# Patient Record
Sex: FEMALE | Race: WHITE | URBAN - METROPOLITAN AREA
[De-identification: names, ages, dates, MRNs, and addresses within clinical notes are randomized per-mention and may not be internally consistent; named-entity substitution may affect disease eponyms.]

---
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
## 2024-10-14 ENCOUNTER — TELEPHONE (OUTPATIENT)
Dept: FAMILY MEDICINE CLINIC | Facility: CLINIC | Age: 24
End: 2024-10-14

## 2024-10-14 NOTE — TELEPHONE ENCOUNTER
Pt's mother is here with the other daughter (sister of pt) today 10/14/24--and mother reports that Iron is needing an Rx of the lexapro 20 mg QD just enough to tide her over until she sees me on 10/24/24 --and mother has picture of the most recent Rx bottle--and reports [facility] pt is changing to our practice her prior PCP will not give her a courtesy refill of this--and pt experiencing SE form the sudden running out of her medication --mother report she had been stable on this med and done well no issues no SI/HI    I am writing a courtesy Rx for only 10 days of the lexapro 20 mg QD

## 2024-10-24 ENCOUNTER — OFFICE VISIT (OUTPATIENT)
Dept: FAMILY MEDICINE CLINIC | Facility: CLINIC | Age: 24
End: 2024-10-24
Payer: COMMERCIAL

## 2024-10-24 ENCOUNTER — LAB (OUTPATIENT)
Dept: LAB | Facility: HOSPITAL | Age: 24
End: 2024-10-24
Payer: COMMERCIAL

## 2024-10-24 VITALS
TEMPERATURE: 97.6 F | OXYGEN SATURATION: 100 % | WEIGHT: 135 LBS | HEIGHT: 64 IN | DIASTOLIC BLOOD PRESSURE: 62 MMHG | SYSTOLIC BLOOD PRESSURE: 124 MMHG | BODY MASS INDEX: 23.05 KG/M2 | HEART RATE: 100 BPM

## 2024-10-24 DIAGNOSIS — R53.83 FATIGUE, UNSPECIFIED TYPE: ICD-10-CM

## 2024-10-24 DIAGNOSIS — Z11.59 ENCOUNTER FOR HEPATITIS C SCREENING TEST FOR LOW RISK PATIENT: ICD-10-CM

## 2024-10-24 DIAGNOSIS — R23.3 EASY BRUISABILITY: ICD-10-CM

## 2024-10-24 DIAGNOSIS — Z87.898 HISTORY OF SYNCOPE: ICD-10-CM

## 2024-10-24 DIAGNOSIS — Z13.220 SCREENING, LIPID: ICD-10-CM

## 2024-10-24 DIAGNOSIS — Z11.8 SCREENING FOR CHLAMYDIAL DISEASE: ICD-10-CM

## 2024-10-24 DIAGNOSIS — F41.9 ANXIETY: ICD-10-CM

## 2024-10-24 DIAGNOSIS — H65.03 NON-RECURRENT ACUTE SEROUS OTITIS MEDIA OF BOTH EARS: ICD-10-CM

## 2024-10-24 DIAGNOSIS — Z01.89 LABORATORY TEST: ICD-10-CM

## 2024-10-24 DIAGNOSIS — Z00.00 ANNUAL PHYSICAL EXAM: ICD-10-CM

## 2024-10-24 DIAGNOSIS — Z71.85 VACCINE COUNSELING: ICD-10-CM

## 2024-10-24 DIAGNOSIS — Z76.89 ENCOUNTER TO ESTABLISH CARE: Primary | ICD-10-CM

## 2024-10-24 PROBLEM — G93.5 CHIARI MALFORMATION TYPE I: Status: ACTIVE | Noted: 2024-10-24

## 2024-10-24 LAB
25(OH)D3 SERPL-MCNC: 59.8 NG/ML (ref 30–100)
ALBUMIN SERPL-MCNC: 4.3 G/DL (ref 3.5–5.2)
ALBUMIN/GLOB SERPL: 1.6 G/DL
ALP SERPL-CCNC: 85 U/L (ref 39–117)
ALT SERPL W P-5'-P-CCNC: 7 U/L (ref 1–33)
ANION GAP SERPL CALCULATED.3IONS-SCNC: 12 MMOL/L (ref 5–15)
AST SERPL-CCNC: 8 U/L (ref 1–32)
BASOPHILS # BLD AUTO: 0.06 10*3/MM3 (ref 0–0.2)
BASOPHILS NFR BLD AUTO: 0.6 % (ref 0–1.5)
BILIRUB SERPL-MCNC: 0.2 MG/DL (ref 0–1.2)
BILIRUB UR QL STRIP: NEGATIVE
BUN SERPL-MCNC: 9 MG/DL (ref 6–20)
BUN/CREAT SERPL: 9.2 (ref 7–25)
C TRACH RRNA CVX QL NAA+PROBE: NOT DETECTED
CALCIUM SPEC-SCNC: 9.7 MG/DL (ref 8.6–10.5)
CHLORIDE SERPL-SCNC: 99 MMOL/L (ref 98–107)
CHOLEST SERPL-MCNC: 252 MG/DL (ref 0–200)
CLARITY UR: CLEAR
CO2 SERPL-SCNC: 26 MMOL/L (ref 22–29)
COLOR UR: YELLOW
CREAT SERPL-MCNC: 0.98 MG/DL (ref 0.57–1)
DEPRECATED RDW RBC AUTO: 37.6 FL (ref 37–54)
EGFRCR SERPLBLD CKD-EPI 2021: 83.3 ML/MIN/1.73
EOSINOPHIL # BLD AUTO: 0.1 10*3/MM3 (ref 0–0.4)
EOSINOPHIL NFR BLD AUTO: 1 % (ref 0.3–6.2)
ERYTHROCYTE [DISTWIDTH] IN BLOOD BY AUTOMATED COUNT: 12.6 % (ref 12.3–15.4)
FERRITIN SERPL-MCNC: 55.3 NG/ML (ref 13–150)
FOLATE SERPL-MCNC: 5.14 NG/ML (ref 4.78–24.2)
GLOBULIN UR ELPH-MCNC: 2.7 GM/DL
GLUCOSE SERPL-MCNC: 81 MG/DL (ref 65–99)
GLUCOSE UR STRIP-MCNC: NEGATIVE MG/DL
HCT VFR BLD AUTO: 41.2 % (ref 34–46.6)
HCV AB SER QL: NORMAL
HDLC SERPL-MCNC: 73 MG/DL (ref 40–60)
HGB BLD-MCNC: 14.2 G/DL (ref 12–15.9)
HGB UR QL STRIP.AUTO: NEGATIVE
HOLD SPECIMEN: NORMAL
IMM GRANULOCYTES # BLD AUTO: 0.04 10*3/MM3 (ref 0–0.05)
IMM GRANULOCYTES NFR BLD AUTO: 0.4 % (ref 0–0.5)
IRON 24H UR-MRATE: 61 MCG/DL (ref 37–145)
IRON SATN MFR SERPL: 12 % (ref 20–50)
KETONES UR QL STRIP: NEGATIVE
LDLC SERPL CALC-MCNC: 159 MG/DL (ref 0–100)
LDLC/HDLC SERPL: 2.13 {RATIO}
LEUKOCYTE ESTERASE UR QL STRIP.AUTO: NEGATIVE
LYMPHOCYTES # BLD AUTO: 3.5 10*3/MM3 (ref 0.7–3.1)
LYMPHOCYTES NFR BLD AUTO: 36.1 % (ref 19.6–45.3)
MCH RBC QN AUTO: 29 PG (ref 26.6–33)
MCHC RBC AUTO-ENTMCNC: 34.5 G/DL (ref 31.5–35.7)
MCV RBC AUTO: 84.1 FL (ref 79–97)
MONOCYTES # BLD AUTO: 0.93 10*3/MM3 (ref 0.1–0.9)
MONOCYTES NFR BLD AUTO: 9.6 % (ref 5–12)
N GONORRHOEA RRNA SPEC QL NAA+PROBE: NOT DETECTED
NEUTROPHILS NFR BLD AUTO: 5.06 10*3/MM3 (ref 1.7–7)
NEUTROPHILS NFR BLD AUTO: 52.3 % (ref 42.7–76)
NITRITE UR QL STRIP: NEGATIVE
NRBC BLD AUTO-RTO: 0 /100 WBC (ref 0–0.2)
PH UR STRIP.AUTO: 6 [PH] (ref 5–8)
PLATELET # BLD AUTO: 320 10*3/MM3 (ref 140–450)
PMV BLD AUTO: 10.2 FL (ref 6–12)
POTASSIUM SERPL-SCNC: 4.3 MMOL/L (ref 3.5–5.2)
PROT SERPL-MCNC: 7 G/DL (ref 6–8.5)
PROT UR QL STRIP: NEGATIVE
RBC # BLD AUTO: 4.9 10*6/MM3 (ref 3.77–5.28)
SODIUM SERPL-SCNC: 137 MMOL/L (ref 136–145)
SP GR UR STRIP: 1.01 (ref 1–1.03)
TIBC SERPL-MCNC: 493 MCG/DL (ref 298–536)
TRANSFERRIN SERPL-MCNC: 331 MG/DL (ref 200–360)
TRIGL SERPL-MCNC: 116 MG/DL (ref 0–150)
TSH SERPL DL<=0.05 MIU/L-ACNC: 4.17 UIU/ML (ref 0.27–4.2)
UROBILINOGEN UR QL STRIP: NORMAL
VIT B12 BLD-MCNC: 546 PG/ML (ref 211–946)
VLDLC SERPL-MCNC: 20 MG/DL (ref 5–40)
WBC NRBC COR # BLD AUTO: 9.69 10*3/MM3 (ref 3.4–10.8)

## 2024-10-24 PROCEDURE — 36415 COLL VENOUS BLD VENIPUNCTURE: CPT | Performed by: NURSE PRACTITIONER

## 2024-10-24 PROCEDURE — 87491 CHLMYD TRACH DNA AMP PROBE: CPT | Performed by: NURSE PRACTITIONER

## 2024-10-24 PROCEDURE — 81003 URINALYSIS AUTO W/O SCOPE: CPT | Performed by: NURSE PRACTITIONER

## 2024-10-24 PROCEDURE — 99385 PREV VISIT NEW AGE 18-39: CPT | Performed by: NURSE PRACTITIONER

## 2024-10-24 PROCEDURE — 86480 TB TEST CELL IMMUN MEASURE: CPT | Performed by: NURSE PRACTITIONER

## 2024-10-24 PROCEDURE — 82728 ASSAY OF FERRITIN: CPT | Performed by: NURSE PRACTITIONER

## 2024-10-24 PROCEDURE — 1126F AMNT PAIN NOTED NONE PRSNT: CPT | Performed by: NURSE PRACTITIONER

## 2024-10-24 PROCEDURE — 83540 ASSAY OF IRON: CPT | Performed by: NURSE PRACTITIONER

## 2024-10-24 PROCEDURE — 84443 ASSAY THYROID STIM HORMONE: CPT | Performed by: NURSE PRACTITIONER

## 2024-10-24 PROCEDURE — 80061 LIPID PANEL: CPT | Performed by: NURSE PRACTITIONER

## 2024-10-24 PROCEDURE — 82746 ASSAY OF FOLIC ACID SERUM: CPT | Performed by: NURSE PRACTITIONER

## 2024-10-24 PROCEDURE — 84466 ASSAY OF TRANSFERRIN: CPT | Performed by: NURSE PRACTITIONER

## 2024-10-24 PROCEDURE — 80053 COMPREHEN METABOLIC PANEL: CPT | Performed by: NURSE PRACTITIONER

## 2024-10-24 PROCEDURE — 2014F MENTAL STATUS ASSESS: CPT | Performed by: NURSE PRACTITIONER

## 2024-10-24 PROCEDURE — 1160F RVW MEDS BY RX/DR IN RCRD: CPT | Performed by: NURSE PRACTITIONER

## 2024-10-24 PROCEDURE — 86803 HEPATITIS C AB TEST: CPT | Performed by: NURSE PRACTITIONER

## 2024-10-24 PROCEDURE — 82607 VITAMIN B-12: CPT | Performed by: NURSE PRACTITIONER

## 2024-10-24 PROCEDURE — 1159F MED LIST DOCD IN RCRD: CPT | Performed by: NURSE PRACTITIONER

## 2024-10-24 PROCEDURE — 87591 N.GONORRHOEAE DNA AMP PROB: CPT | Performed by: NURSE PRACTITIONER

## 2024-10-24 PROCEDURE — 85025 COMPLETE CBC W/AUTO DIFF WBC: CPT | Performed by: NURSE PRACTITIONER

## 2024-10-24 PROCEDURE — 82306 VITAMIN D 25 HYDROXY: CPT | Performed by: NURSE PRACTITIONER

## 2024-10-24 RX ORDER — ESCITALOPRAM OXALATE 20 MG/1
20 TABLET ORAL DAILY
Qty: 30 TABLET | Refills: 5 | Status: SHIPPED | OUTPATIENT
Start: 2024-10-24

## 2024-10-24 RX ORDER — ESCITALOPRAM OXALATE 20 MG/1
1 TABLET ORAL DAILY
COMMUNITY
Start: 2024-07-03 | End: 2024-10-24 | Stop reason: SDUPTHER

## 2024-10-24 RX ORDER — AZITHROMYCIN 250 MG/1
TABLET, FILM COATED ORAL
Qty: 6 TABLET | Refills: 0 | Status: SHIPPED | OUTPATIENT
Start: 2024-10-24

## 2024-10-24 RX ORDER — NORGESTIMATE AND ETHINYL ESTRADIOL 7DAYSX3 28
KIT ORAL
COMMUNITY

## 2024-10-24 NOTE — PROGRESS NOTES
Chief Complaint  Establish Care (New patient. She needs to get a TB Gold lab draw.  She would like to have a good lab work up.  ) and Depression (Follow up on depression and anxiety medication. )    Subjective            Iron Meredith presents to [facility]  History of Present Illness  Patient is here to establish care will need her wellness just from the primary care standpoint as she is supposed to have a TB Gold test done for TB screening for work    Also medication refills regarding the anxiety medication Lexapro    Also patient would like a thorough lab workup as well with regards to her chronic fatigue and she does have a significant history of syncope and has seen neurology in the past also with her migraines used to be on amitriptyline but patient no longer sees Dr. Chin as he retired and does not feel as though her migraines are an issue any longer    And then she is also on birth control pills and is sexually active has had a Pap smear in the past in 2022 and was good but does not want the Pap smear at this time      PHQ-2 Total Score: 0  PHQ-9 Total Score:      Past Medical History:   Diagnosis Date    Anxiety     Headache        Allergies   Allergen Reactions    Augmentin [Amoxicillin-Pot Clavulanate] Unknown - High Severity    Sulfa Antibiotics Unknown - High Severity        History reviewed. No pertinent surgical history.     Social History     Tobacco Use    Smoking status: Never     Passive exposure: Never    Smokeless tobacco: Never   Vaping Use    Vaping status: Never Used   Substance Use Topics    Alcohol use: Never    Drug use: Never       Family History   Problem Relation Age of Onset    Anxiety disorder Mother     Asthma Mother     Diabetes Mother     Hyperlipidemia Mother     Thyroid disease Mother     Asthma Sister         Health Maintenance Due   Topic Date Due    HEPATITIS C SCREENING  Never done    ANNUAL PHYSICAL  Never done    CHLAMYDIA SCREENING   10/14/2024        Current Outpatient Medications on File Prior to Visit   Medication Sig    norgestimate-ethinyl estradiol (Tri-Estarylla) 0.18/0.215/0.25 MG-35 MCG per tablet     [DISCONTINUED] escitalopram (LEXAPRO) 20 MG tablet Take 1 tablet by mouth Daily.     No current facility-administered medications on file prior to visit.       Immunization History   Administered Date(s) Administered    COVID-19 (MODERNA) 1st,2nd,3rd Dose Monovalent 05/05/2021, 06/02/2021    DTaP, Unspecified 12/13/2004    FluMist 2-49yrs 10/12/2015, 11/11/2019, 11/20/2020    FluMist 2-49yrs (Nasal) 11/05/2009, 10/16/2012, 10/19/2013, 10/13/2014    Fluzone  >6mos 10/02/2024    Fluzone (or Fluarix & Flulaval for VFC) >6mos 02/12/2024    Hep A, 2 Dose 02/13/2018, 08/16/2018    Hepatitis B 2 Dose Vaccine Heplisav-B 09/20/2024    IPV 12/13/2004    Influenza Seasonal Injectable 10/16/2010, 10/19/2011    MCV4 Unspecified 12/05/2011    MMR 12/13/2004    Meningococcal Conjugate 07/25/2017    Tdap 12/05/2011    Varicella 10/02/2024       Review of Systems   Constitutional:  Positive for fatigue.   HENT:  Positive for ear pain. Negative for trouble swallowing.    Eyes:  Negative for blurred vision and double vision.   Respiratory:  Negative for choking and shortness of breath.         SOB/chest palpations only with anxiety attack   Cardiovascular:  Negative for chest pain.   Gastrointestinal:  Negative for abdominal pain and blood in stool.   Endocrine: Negative for polydipsia, polyphagia and polyuria.   Genitourinary:  Negative for difficulty urinating, dysuria and menstrual problem.   Musculoskeletal: Negative.    Neurological:  Negative for dizziness, seizures, syncope and light-headedness.        Dizziness and lightheadedness only with anxiety attack---also Hx of fainting with severe panic attacks --last fainted Feb 2023    Hematological:  Bruises/bleeds easily.   Psychiatric/Behavioral:  Negative for self-injury, suicidal ideas and depressed  "mood. The patient is nervous/anxious.         Objective     /62   Pulse 100   Temp 97.6 °F (36.4 °C) (Temporal)   Ht 161.9 cm (63.75\")   Wt 61.2 kg (135 lb)   SpO2 100%   BMI 23.35 kg/m²       Physical Exam  Vitals and nursing note reviewed. Exam conducted with a chaperone present (mother and sister).   Constitutional:       Appearance: Normal appearance.   HENT:      Head: Normocephalic.      Right Ear: Ear canal and external ear normal. Tympanic membrane is injected and erythematous.      Left Ear: Ear canal and external ear normal. Tympanic membrane is injected and erythematous.      Nose: Nose normal.      Mouth/Throat:      Mouth: Mucous membranes are moist.   Eyes:      Pupils: Pupils are equal, round, and reactive to light.   Cardiovascular:      Rate and Rhythm: Normal rate and regular rhythm.      Heart sounds: Normal heart sounds.   Pulmonary:      Effort: Pulmonary effort is normal.      Breath sounds: Normal breath sounds.   Abdominal:      Palpations: Abdomen is soft.   Musculoskeletal:         General: Normal range of motion.      Cervical back: Normal range of motion and neck supple.   Skin:     General: Skin is warm and dry.      Coloration: Skin is pale.   Neurological:      Mental Status: She is alert and oriented to person, place, and time.   Psychiatric:         Mood and Affect: Mood normal.         Behavior: Behavior normal.         Thought Content: Thought content normal.         Judgment: Judgment normal.         Result Review :     The following data was reviewed by: MEJIA Aldana on 10/24/2024:                 Reviewed neurology office visit 12/16/2021 Dr. Chin  Reviewed neurology visit Dr. Chin 8/19/2021  Reviewed neurology visit Dr. Chin 4/18/2022  Reviewed her prior primary care provider Ms. Barragan office visit 2/7/2022  Reviewed prior primary care provider MsDeandre Laurel office visit 12/6/2023     Assessment and Plan      Diagnoses and all orders for this " visit:    1. Encounter to [facility] care (Primary)  -     QuantiFERON-TB Gold Plus  -     Chlamydia trachomatis, Neisseria gonorrhoeae, PCR - Urine, Urine, Clean Catch    2. Annual physical exam  Comments:  from Primary care standpoint and for thorough labs  Orders:  -     QuantiFERON-TB Gold Plus  -     Hepatitis C Antibody  -     CBC & Differential  -     Comprehensive Metabolic Panel  -     Lipid Panel  -     TSH Rfx On Abnormal To Free T4  -     Urinalysis With Culture If Indicated -  -     Vitamin D,25-Hydroxy  -     Vitamin B12 & Folate  -     Iron Profile  -     Ferritin  -     Chlamydia trachomatis, Neisseria gonorrhoeae, PCR - Urine, Urine, Clean Catch    3. Anxiety  -     escitalopram (LEXAPRO) 20 MG tablet; Take 1 tablet by mouth Daily.  Dispense: 30 tablet; Refill: 5  -     CBC & Differential  -     Comprehensive Metabolic Panel  -     Lipid Panel  -     TSH Rfx On Abnormal To Free T4  -     Urinalysis With Culture If Indicated -    4. 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 Encounter for hepatitis C screening test for low risk patient  -     Hepatitis C Antibody    6. Screening, lipid  -     Lipid Panel    7. History of syncope  -     CBC & Differential  -     Comprehensive Metabolic Panel  -     TSH Rfx On Abnormal To Free T4  -     Vitamin D,25-Hydroxy  -     Vitamin B12 & Folate  -     Iron Profile  -     Ferritin    8. Fatigue, unspecified type  -     Hepatitis C Antibody  -     CBC & Differential  -     Comprehensive Metabolic Panel  -     Lipid Panel  -     TSH Rfx On Abnormal To Free T4  -     Urinalysis With Culture If Indicated -  -     Vitamin D,25-Hydroxy  -     Vitamin B12 & Folate  -     Iron Profile  -     Ferritin    9. Screening for chlamydial disease  -     Chlamydia trachomatis, Neisseria gonorrhoeae, PCR - Urine, Urine, Clean  Catch    10. Vaccine counseling  Comments:  D/W pt the need for HPV, TDAP, and COVID    11. Easy bruisability  -     CBC & Differential  -     Iron Profile  -     Ferritin    12. Non-recurrent acute serous otitis media of both ears  -     azithromycin (Zithromax Z-Joe) 250 MG tablet; Take 2 tablets by mouth on day 1, then 1 tablet daily on days 2-5  Dispense: 6 tablet; Refill: 0    Went over history thoroughly obtaining labs for her school and for the thorough workup with all of her symptoms and patient is fasting today drinking water and she is getting everything drawn at the diagnostic center so that she can get the TB Gold test as well    We did discuss the needed immunizations patient wants to do these in the future little by little but declines them today    Declines a Pap smear at this time    And refilled her Lexapro as noted above we did have an in-depth conversation with the guards to the fact that I do feel like based on her history is actually generalized anxiety disorder with panic aspect-and that if ever she needs any further evaluation or treatment beyond the Lexapro 20 mg daily we do have Ms. Quintanilla who NP who is established here that she can see on a regular basis locally and patient was very open and amendable to this because she used to see psychiatry in the past and I did let her know she could message me if she changed her mind about it right now or in the future and wanted to go ahead and get established with Ms. Quintanilla        Follow Up     Return in about 6 months (around 4/24/2025), or if symptoms worsen or fail to improve, for Recheck.    Patient was given instructions and counseling regarding her condition or for health maintenance advice. Please see specific information pulled into the AVS if appropriate.     BMI is within normal parameters. No other follow-up for BMI required.      Iron Meredith  reports that she has never smoked. She has never been exposed to tobacco smoke. She has never  used smokeless tobacco. I have educated her on the risk of diseases from using tobacco products such as cancer, COPD, and heart disease.

## 2024-10-25 ENCOUNTER — PATIENT MESSAGE (OUTPATIENT)
Dept: FAMILY MEDICINE CLINIC | Facility: CLINIC | Age: 24
End: 2024-10-25
Payer: COMMERCIAL

## 2024-10-26 LAB
GAMMA INTERFERON BACKGROUND BLD IA-ACNC: 0.06 IU/ML
M TB IFN-G BLD-IMP: NEGATIVE
M TB IFN-G CD4+ BCKGRND COR BLD-ACNC: 0.09 IU/ML
M TB IFN-G CD4+CD8+ BCKGRND COR BLD-ACNC: 0.1 IU/ML
MITOGEN IGNF BCKGRD COR BLD-ACNC: >10 IU/ML
QUANTIFERON INCUBATION: NORMAL
SERVICE CMNT-IMP: NORMAL

## 2024-10-26 NOTE — PROGRESS NOTES
Please mail letter to patient stating    Iron your cholesterol panel shows normal triglycerides and excellent HDL which is your good cholesterol and then the LDL which is the bad cholesterol was elevated at 159 and it should be less than 100 when fasting and at your age I would just recommend continuing to stay active getting plenty of exercise like walking 30 to 60 minutes daily and a low-cholesterol diet like limiting your fast food and fried foods    And then the iron panel shows the total iron and transferrin and total iron-binding capacity completely normal and then the iron saturation was a little bit low and your blood counts were normal range and the vitamin B12 and folic acid normal range and the vitamin D levels were normal and the ferritin level was normal and your thyroid levels were normal and your urinalysis was normal and the comprehensive panel shows normal glucose and normal kidney and liver function test and normal electrolytes and the hepatitis C antibody screening negative

## 2024-12-18 ENCOUNTER — OFFICE VISIT (OUTPATIENT)
Dept: FAMILY MEDICINE CLINIC | Facility: CLINIC | Age: 24
End: 2024-12-18
Payer: COMMERCIAL

## 2024-12-18 VITALS
HEART RATE: 86 BPM | HEIGHT: 64 IN | OXYGEN SATURATION: 100 % | TEMPERATURE: 99.6 F | BODY MASS INDEX: 23.73 KG/M2 | WEIGHT: 139 LBS

## 2024-12-18 DIAGNOSIS — J06.9 VIRAL UPPER RESPIRATORY TRACT INFECTION: Primary | ICD-10-CM

## 2024-12-18 DIAGNOSIS — U07.1 COVID-19 VIRUS INFECTION: ICD-10-CM

## 2024-12-18 PROBLEM — G43.829 MENSTRUAL MIGRAINE WITHOUT STATUS MIGRAINOSUS, NOT INTRACTABLE: Status: ACTIVE | Noted: 2021-08-19

## 2024-12-18 PROBLEM — R55 SYNCOPE AND COLLAPSE: Status: ACTIVE | Noted: 2022-05-03

## 2024-12-18 PROBLEM — G43.009 MIGRAINE WITHOUT AURA AND WITHOUT STATUS MIGRAINOSUS, NOT INTRACTABLE: Status: ACTIVE | Noted: 2021-08-19

## 2024-12-18 PROBLEM — R20.2 NUMBNESS AND TINGLING IN LEFT HAND: Status: ACTIVE | Noted: 2021-08-19

## 2024-12-18 PROBLEM — R20.0 NUMBNESS AND TINGLING IN LEFT HAND: Status: ACTIVE | Noted: 2021-08-19

## 2024-12-18 PROBLEM — R20.8 ALLODYNIA: Status: ACTIVE | Noted: 2021-12-16

## 2024-12-18 PROBLEM — R42 VERTIGO: Status: ACTIVE | Noted: 2021-08-19

## 2024-12-18 LAB
EXPIRATION DATE: ABNORMAL
EXPIRATION DATE: NORMAL
FLUAV AG UPPER RESP QL IA.RAPID: NOT DETECTED
FLUBV AG UPPER RESP QL IA.RAPID: NOT DETECTED
INTERNAL CONTROL: ABNORMAL
INTERNAL CONTROL: NORMAL
Lab: ABNORMAL
Lab: NORMAL
S PYO AG THROAT QL: NEGATIVE
SARS-COV-2 AG UPPER RESP QL IA.RAPID: DETECTED

## 2024-12-18 PROCEDURE — 99213 OFFICE O/P EST LOW 20 MIN: CPT | Performed by: FAMILY MEDICINE

## 2024-12-18 PROCEDURE — 1126F AMNT PAIN NOTED NONE PRSNT: CPT | Performed by: FAMILY MEDICINE

## 2024-12-18 PROCEDURE — 87428 SARSCOV & INF VIR A&B AG IA: CPT | Performed by: FAMILY MEDICINE

## 2024-12-18 PROCEDURE — 87880 STREP A ASSAY W/OPTIC: CPT | Performed by: FAMILY MEDICINE

## 2024-12-18 NOTE — PROGRESS NOTES
"Chief Complaint    Sore Throat (Sore throat, cough, sinus pressure/drainage x 3 days.  Taking Mucinex)    Subjective      Iron Meredith presents to [facility]    1.) URI : Onset - 3 days ago.  Patient presents with complaint of sore throat.  She is also experiencing sinus/nasal congestion and drainage.  Admits to a cough.  No difficulty in breathing.  Utilizing Mucinex with mild improvement of symptoms.  No documented history of lung pathology.    Objective     Vital Signs:     Pulse 86   Temp 99.6 °F (37.6 °C) (Temporal)   Ht 161.9 cm (63.75\")   Wt 63 kg (139 lb)   SpO2 100%   BMI 24.05 kg/m²       Physical Exam  Vitals reviewed.   Constitutional:       General: She is not in acute distress.     Appearance: Normal appearance. She is well-developed.   HENT:      Head: Normocephalic and atraumatic.      Right Ear: Hearing and external ear normal. Tympanic membrane is not erythematous or bulging.      Left Ear: Hearing and external ear normal. Tympanic membrane is not erythematous or bulging.      Nose: Congestion present. No rhinorrhea.      Mouth/Throat:      Pharynx: Posterior oropharyngeal erythema present. No oropharyngeal exudate.   Eyes:      General: Lids are normal.         Right eye: No discharge.         Left eye: No discharge.      Conjunctiva/sclera: Conjunctivae normal.   Pulmonary:      Effort: Pulmonary effort is normal.      Breath sounds: Normal breath sounds.   Abdominal:      General: There is no distension.   Musculoskeletal:         General: No swelling.      Cervical back: Neck supple.   Skin:     Coloration: Skin is not jaundiced.      Findings: No erythema.   Neurological:      Mental Status: She is alert. Mental status is at baseline.   Psychiatric:         Mood and Affect: Mood and affect normal.         Thought Content: Thought content normal.     BMI is within normal parameters. No other follow-up for BMI required.  Assessment and Plan     Diagnoses " and all orders for this visit:    1. Viral upper respiratory tract infection (Primary)  Comments:  Recommend adequate fluids and rest.  Acetaminophen/NSAID as needed.  Warm liquids/cough drops for throat.  Orders:  -     POCT SARS-CoV-2 Antigen WILFREDO + Flu  -     POCT rapid strep A    2. COVID-19 virus infection  Comments:  Precautions discussed.  Call office with any alarming findings.    Follow Up     Return if symptoms worsen or fail to improve.    Patient was given instructions and counseling regarding her condition or for health maintenance advice. Please see specific information pulled into the AVS if appropriate.

## 2024-12-26 DIAGNOSIS — F41.9 ANXIETY: ICD-10-CM

## 2024-12-26 RX ORDER — ESCITALOPRAM OXALATE 20 MG/1
20 TABLET ORAL DAILY
Qty: 30 TABLET | Refills: 3 | Status: SHIPPED | OUTPATIENT
Start: 2024-12-26

## 2024-12-26 NOTE — TELEPHONE ENCOUNTER
In October had already sent in a 6-month supply now pharmacy is requesting it again in December so I have sent in a 4-month supply

## 2025-04-28 ENCOUNTER — OFFICE VISIT (OUTPATIENT)
Dept: FAMILY MEDICINE CLINIC | Facility: CLINIC | Age: 25
End: 2025-04-28
Payer: COMMERCIAL

## 2025-04-28 VITALS
HEART RATE: 86 BPM | OXYGEN SATURATION: 100 % | SYSTOLIC BLOOD PRESSURE: 122 MMHG | HEIGHT: 64 IN | DIASTOLIC BLOOD PRESSURE: 72 MMHG | TEMPERATURE: 97 F | BODY MASS INDEX: 23.9 KG/M2 | WEIGHT: 140 LBS

## 2025-04-28 DIAGNOSIS — R68.82 LOW LIBIDO: ICD-10-CM

## 2025-04-28 DIAGNOSIS — F41.9 ANXIETY: Primary | ICD-10-CM

## 2025-04-28 PROCEDURE — 99214 OFFICE O/P EST MOD 30 MIN: CPT | Performed by: NURSE PRACTITIONER

## 2025-04-28 PROCEDURE — 1159F MED LIST DOCD IN RCRD: CPT | Performed by: NURSE PRACTITIONER

## 2025-04-28 PROCEDURE — 1126F AMNT PAIN NOTED NONE PRSNT: CPT | Performed by: NURSE PRACTITIONER

## 2025-04-28 PROCEDURE — 1160F RVW MEDS BY RX/DR IN RCRD: CPT | Performed by: NURSE PRACTITIONER

## 2025-04-28 RX ORDER — ESCITALOPRAM OXALATE 20 MG/1
20 TABLET ORAL DAILY
Qty: 30 TABLET | Refills: 0 | Status: SHIPPED | OUTPATIENT
Start: 2025-04-28

## 2025-04-28 NOTE — PROGRESS NOTES
Chief Complaint  Depression (Medication refill. She is wondering about the side effect with the low sex drive. )    Subjective            Iron Meredith presents to [facility]  History of Present Illness    History of Present Illness  The patient presents for evaluation of anxiety, low libido, and syncope.    She has been on Lexapro 20 mg for approximately 1.5 years, which has significantly improved her anxiety. Concern is expressed about reducing the dosage due to the effective control of her anxiety. No suicidal or self-injurious tendencies are reported. Additionally, there are no seizures, lightheadedness, dizziness, chest pain, shortness of breath, nausea, vomiting, abdominal pain, or fatigue. A previous diagnosis of bipolar disorder is noted, but she is currently not on any medication for it and reports feeling well.    A long-standing history of anxiety, dating back to childhood, is noted, often manifesting as fainting episodes during stressful situations. An incident in 10/2024 is recalled where she fainted twice while having her blood drawn. Hyperventilation during periods of high stress or injury is experienced, a symptom present since the age of 4. Care under neurologist Dr. Shayan Chin for several years is mentioned, who attributed her symptoms to anxiety. An MRI revealed a Chiari malformation, but no epilepsy was detected. A sleep study was also conducted. Past procedures include an EKG and EEG.    Currently, she is under the care of a gynecologist for Pap smears and uses an saad for birth control. The last Pap smear was conducted in 02/2021. Headaches around her menstrual cycle are reported, but they are not as severe as previous migraines. Sensitivity to fragrances is noted, and an attempt to use a fragrance-free lubricant resulted in a burning sensation during intercourse.      PHQ-2 Total Score: 0    PHQ-9 Total Score:        Past Medical History:   Diagnosis Date     Anxiety     Headache        Allergies   Allergen Reactions    Augmentin [Amoxicillin-Pot Clavulanate] Unknown - High Severity    Sulfa Antibiotics Unknown - High Severity        History reviewed. No pertinent surgical history.     Social History     Tobacco Use    Smoking status: Never     Passive exposure: Never    Smokeless tobacco: Never   Vaping Use    Vaping status: Never Used   Substance Use Topics    Alcohol use: Never    Drug use: Never       Family History   Problem Relation Age of Onset    Anxiety disorder Mother     Asthma Mother     Diabetes Mother     Hyperlipidemia Mother     Thyroid disease Mother     Asthma Sister         Health Maintenance Due   Topic Date Due    PAP SMEAR  02/07/2025        Current Outpatient Medications on File Prior to Visit   Medication Sig    norgestimate-ethinyl estradiol (Tri-Estarylla) 0.18/0.215/0.25 MG-35 MCG per tablet     [DISCONTINUED] escitalopram (LEXAPRO) 20 MG tablet TAKE 1 TABLET BY MOUTH DAILY     No current facility-administered medications on file prior to visit.       Immunization History   Administered Date(s) Administered    COVID-19 (MODERNA) 1st,2nd,3rd Dose Monovalent 05/05/2021, 06/02/2021    DTaP, Unspecified 12/13/2004    FluMist 2-49yrs 10/12/2015, 11/11/2019, 11/20/2020    FluMist 2-49yrs (Nasal) 11/05/2009, 10/16/2012, 10/19/2013, 10/13/2014    Fluzone  >6mos 10/02/2024    Fluzone (or Fluarix & Flulaval for VFC) >6mos 02/12/2024    Hep A, 2 Dose 02/13/2018, 08/16/2018    Hepatitis B 2 Dose Vaccine Heplisav-B 09/20/2024    IPV 12/13/2004    Influenza Seasonal Injectable 10/16/2010, 10/19/2011    MCV4 Unspecified 12/05/2011    MMR 12/13/2004    Meningococcal Conjugate 07/25/2017    Tdap 12/05/2011    Varicella 10/02/2024       Review of Systems   Constitutional:  Negative for chills, diaphoresis, fatigue and fever.   HENT:  Negative for congestion, sore throat and swollen glands.    Respiratory:  Negative for cough and shortness of breath.   "  Cardiovascular:  Negative for chest pain.   Gastrointestinal:  Negative for abdominal pain, nausea and vomiting.   Genitourinary:  Positive for decreased libido. Negative for dysuria.   Musculoskeletal:  Negative for myalgias and neck pain.   Skin:  Negative for rash.   Neurological:  Negative for dizziness, seizures, syncope, weakness, light-headedness and numbness.   Psychiatric/Behavioral:  Negative for self-injury and suicidal ideas. The patient is nervous/anxious.         Objective     /72   Pulse 86   Temp 97 °F (36.1 °C) (Temporal)   Ht 161.9 cm (63.75\")   Wt 63.5 kg (140 lb)   SpO2 100%   BMI 24.22 kg/m²       Physical Exam  Vitals and nursing note reviewed.   Constitutional:       Appearance: Normal appearance.   HENT:      Head: Normocephalic.      Right Ear: External ear normal.      Left Ear: External ear normal.      Nose: Nose normal.      Mouth/Throat:      Mouth: Mucous membranes are moist.   Eyes:      Pupils: Pupils are equal, round, and reactive to light.   Cardiovascular:      Rate and Rhythm: Normal rate and regular rhythm.      Heart sounds: Normal heart sounds.   Pulmonary:      Effort: Pulmonary effort is normal.      Breath sounds: Normal breath sounds.   Abdominal:      Palpations: Abdomen is soft.   Musculoskeletal:         General: Normal range of motion.      Cervical back: Normal range of motion.   Skin:     General: Skin is warm and dry.   Neurological:      Mental Status: She is alert and oriented to person, place, and time.   Psychiatric:         Mood and Affect: Mood normal.         Behavior: Behavior normal.         Thought Content: Thought content normal.         Judgment: Judgment normal.         Result Review :                    Results  Imaging   - MRI: Chiari malformation               Assessment and Plan      Diagnoses and all orders for this visit:    1. Anxiety (Primary)  -     escitalopram (LEXAPRO) 20 MG tablet; Take 1 tablet by mouth Daily.  Dispense: 30 " tablet; Refill: 0  -     Ambulatory Referral to Psychiatry    2. Low libido, possibly med related  -     Ambulatory Referral to Psychiatry    Did reach out to Krupa Quintanilla NP to help provider--we are attempting to obtain all of those prior workup and records through the [facility] and the prior hospital [facility] as [facility]--and then referral to mental provider since the patient is so anxious about tapering down on this medicine or changing to anything else regarding the effects that she is receiving from the Lexapro and the low libido-and I did reassure her that she could explain her anxiety to provider and they would go slow with med changes       Assessment & Plan  1. Anxiety.  - Severe history of anxiety with fainting episodes, well-managed with Lexapro 20 mg daily.  - Lexapro has led to a decrease in libido; patient is apprehensive about altering medication regimen.  - Referral to Claudia Quintanilla, mental health provider, for evaluation and potential weaning off Lexapro while introducing an alternative medication to maintain anxiety control.  - One-month supply of Lexapro 20 mg will be provided.    2. Low libido.  - Decreased libido possibly related to Lexapro 20 mg daily.  - Advised to try Platinum Wet lubricant, available in a fragrance-free version at [employer] or [employer].  - If burning sensation persists, referral to a gynecologist will be considered.    3. Syncope.  - History of syncope associated with anxiety, present since age [age].  - Previous evaluations included MRI and EEG at [facility]; records are not available.  - Efforts will be made to obtain these records to ensure comprehensive care.          Follow Up     Return if symptoms worsen or fail to improve.    Patient was given instructions and counseling regarding her condition or for health maintenance advice. Please see specific information pulled into the AVS if appropriate.     BMI is within normal parameters. No other  follow-up for BMI required.      Iron Meredith  reports that she has never smoked. She has never been exposed to tobacco smoke. She has never used smokeless tobacco. I have educated her on the risk of diseases from using tobacco products such as cancer, COPD, and heart disease.           Patient or patient representative verbalized consent for the use of Ambient Listening during the visit with  MEJIA Aldana for chart documentation. 4/28/2025  10:43 EDT

## 2025-05-02 ENCOUNTER — OFFICE VISIT (OUTPATIENT)
Age: 25
End: 2025-05-02
Payer: COMMERCIAL

## 2025-05-02 VITALS
BODY MASS INDEX: 24.77 KG/M2 | OXYGEN SATURATION: 99 % | HEART RATE: 77 BPM | HEIGHT: 63 IN | SYSTOLIC BLOOD PRESSURE: 120 MMHG | DIASTOLIC BLOOD PRESSURE: 80 MMHG | WEIGHT: 139.8 LBS

## 2025-05-02 DIAGNOSIS — F41.1 GENERALIZED ANXIETY DISORDER: ICD-10-CM

## 2025-05-02 DIAGNOSIS — F31.30 BIPOLAR I DISORDER, MOST RECENT EPISODE DEPRESSED: Primary | ICD-10-CM

## 2025-05-02 PROCEDURE — 1160F RVW MEDS BY RX/DR IN RCRD: CPT | Performed by: NURSE PRACTITIONER

## 2025-05-02 PROCEDURE — 1159F MED LIST DOCD IN RCRD: CPT | Performed by: NURSE PRACTITIONER

## 2025-05-02 PROCEDURE — 90792 PSYCH DIAG EVAL W/MED SRVCS: CPT | Performed by: NURSE PRACTITIONER

## 2025-05-02 RX ORDER — BUSPIRONE HYDROCHLORIDE 5 MG/1
TABLET ORAL
Qty: 92 TABLET | Refills: 0 | Status: SHIPPED | OUTPATIENT
Start: 2025-05-02 | End: 2025-06-01

## 2025-05-02 NOTE — TREATMENT PLAN
Multi-Disciplinary Problems (from Behavioral Health Treatment Plan)      Active Problems       Problem: Anxiety  Start Date: 05/02/25      Problem Details: The patient self-scales this problem as a 10 with 10 being the worst.          Goal Priority Start Date Expected End Date End Date    Patient will develop and implement behavioral and cognitive strategies to reduce anxiety and irrational fears. -- 05/02/25 10/31/25 --    Goal Details: Progress toward goal:  Not appropriate to rate progress toward goal since this is the initial treatment plan.        Goal Intervention Frequency Start Date End Date    Help patient explore past emotional issues in relation to present anxiety. Q3 Months 05/02/25 --    Intervention Details: Duration of treatment until remission of symptoms.        Goal Intervention Frequency Start Date End Date    Help patient develop an awareness of their cognitive and physical responses to anxiety. Q3 Months 05/02/25 --    Intervention Details: Duration of treatment until remission of symptoms.                               I have discussed and reviewed this treatment plan with the patient.
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
## 2025-05-22 ENCOUNTER — TELEPHONE (OUTPATIENT)
Age: 25
End: 2025-05-22
Payer: COMMERCIAL

## 2025-05-27 NOTE — TELEPHONE ENCOUNTER
LMTCB      Patient sent a ftopiahart message to [facility] which was forwarded to me with concerns about sleepiness. She has an appointment schedule with me on 05/30/25. Please contact patient and inform her that we can discuss her sleep concerns at that appointment and discuss a sleep medicine referral if appropriate. Please also inform patient that she is unable to send ftopiahart messages to behavioral health. Thank you.

## 2025-05-30 ENCOUNTER — OFFICE VISIT (OUTPATIENT)
Age: 25
End: 2025-05-30
Payer: COMMERCIAL

## 2025-05-30 VITALS
HEIGHT: 63 IN | DIASTOLIC BLOOD PRESSURE: 70 MMHG | WEIGHT: 144 LBS | OXYGEN SATURATION: 100 % | SYSTOLIC BLOOD PRESSURE: 116 MMHG | HEART RATE: 87 BPM | BODY MASS INDEX: 25.52 KG/M2

## 2025-05-30 DIAGNOSIS — F41.1 GENERALIZED ANXIETY DISORDER: ICD-10-CM

## 2025-05-30 DIAGNOSIS — F41.9 ANXIETY: ICD-10-CM

## 2025-05-30 DIAGNOSIS — R40.0 HAS DAYTIME DROWSINESS: ICD-10-CM

## 2025-05-30 DIAGNOSIS — F31.30 BIPOLAR I DISORDER, MOST RECENT EPISODE DEPRESSED: Primary | ICD-10-CM

## 2025-05-30 RX ORDER — ESCITALOPRAM OXALATE 20 MG/1
20 TABLET ORAL DAILY
Qty: 90 TABLET | Refills: 0 | Status: SHIPPED | OUTPATIENT
Start: 2025-05-30 | End: 2025-08-28
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
## 2025-06-13 ENCOUNTER — OFFICE VISIT (OUTPATIENT)
Dept: SLEEP MEDICINE | Facility: HOSPITAL | Age: 25
End: 2025-06-13
Payer: COMMERCIAL

## 2025-06-13 VITALS
HEIGHT: 63 IN | SYSTOLIC BLOOD PRESSURE: 118 MMHG | BODY MASS INDEX: 25.66 KG/M2 | DIASTOLIC BLOOD PRESSURE: 71 MMHG | OXYGEN SATURATION: 100 % | HEART RATE: 63 BPM | WEIGHT: 144.8 LBS

## 2025-06-13 DIAGNOSIS — G47.10 HYPERSOMNIA: Primary | ICD-10-CM

## 2025-06-13 PROCEDURE — G0463 HOSPITAL OUTPT CLINIC VISIT: HCPCS

## 2025-06-13 NOTE — PROGRESS NOTES
[facility]      Patient Care Team:  Magaly Naqvi APRN as PCP - General (Nurse Practitioner)    Referring Provider: Claudia Quintanilla APRN    Chief complaint:   Excessive sleepiness    History of present illness:    Subjective   This is a 24 year old female patient with hx Depression diagnosed in 2017 treated with different regimens. Hx of Bipolar disorder. Follows with psychiatry.   She was on Lexapro alone but added Vraylar and Buspar 6 weeks ago.     She described issues with sleepiness for a while (described as taking naps during the day) but over the last 2 months her sleepiness got much worse. Sleep is not refreshing.   No sleep paralysis, sleep hallucination but reported episodes of weakness in the arms with emotions.     Sleep schedule:  -Bedtime: 10:30 PM- 11 PM   -Sleep latency: few min  -Wake up time: 7 AM , does not feel refreshed. Sets up an alarm- snoozes 3 times up to noon on weekends and can even go back to bed for naps for 4 hours (usually 2 hours).   -Nocturnal awakening: None.      ESS: Total score: 18     Review of Systems  Constitutional: No fever or chills. No changes in appetite.   ENMT: No sinus congestion, postnasal drip, hoarsness  Cardiovascular: No chest pain, palpitation or legs swelling.    Respiratory: No dyspnea, cough or wheezing.  Gastrointestinal: No constipation, diarrhea, abdominal pain or acid reflux  Neurology: No headache, weakness, numbness or dizziness.   Musculoskeletal: No joints pain, stiffness or swelling.   Psychiatry: No depression, anxiety or irritability.   Hem/Lymphatic: No swollen glands or easy bruising.  Integumentary: No rash.  Endocrinology: No excessive thirst, cold or warm intolerance.   Urinary: No dysuria, bloody urine or frequent urination.     History  Past Medical History:   Diagnosis Date    Anxiety     Depression     Headache     Seizures    , History reviewed. No pertinent surgical  "history.,   Family History   Problem Relation Age of Onset    Anxiety disorder Mother     Asthma Mother     Diabetes Mother     Hyperlipidemia Mother     Thyroid disease Mother     Asthma Sister    ,   Social History     Socioeconomic History    Marital status: Single   Tobacco Use    Smoking status: Never     Passive exposure: Never    Smokeless tobacco: Never   Vaping Use    Vaping status: Never Used   Substance and Sexual Activity    Alcohol use: Never    Drug use: Never    Sexual activity: Yes     Partners: Male     Birth control/protection: Condom, Birth control pill     E-cigarette/Vaping    E-cigarette/Vaping Use Never User     Passive Exposure No     Counseling Given No      E-cigarette/Vaping Substances    Nicotine No     THC No     CBD No     Flavoring No      E-cigarette/Vaping Devices    Disposable No     Pre-filled or Refillable Cartridge No     Refillable Tank No     Pre-filled Pod No          , and Allergies:  Augmentin [amoxicillin-pot clavulanate] and Sulfa antibiotics    Medications:    Current Outpatient Medications:     Cariprazine HCl (VRAYLAR) 1.5 MG capsule capsule, Take 1 capsule by mouth Daily for 90 days., Disp: 90 capsule, Rfl: 0    escitalopram (LEXAPRO) 20 MG tablet, Take 1 tablet by mouth Daily for 90 days., Disp: 90 tablet, Rfl: 0    norgestimate-ethinyl estradiol (Tri-Estarylla) 0.18/0.215/0.25 MG-35 MCG per tablet, , Disp: , Rfl:     busPIRone (BUSPAR) 5 MG tablet, Take 1 tablet by mouth 2 (Two) Times a Day for 14 days, THEN 2 tablets 2 (Two) Times a Day for 16 days., Disp: 92 tablet, Rfl: 0      Objective   Vital Signs:  Vitals:    06/13/25 0900   BP: 118/71   Pulse: 63   SpO2: 100%   Weight: 65.7 kg (144 lb 12.8 oz)   Height: 160 cm (63\")     Body mass index is 25.65 kg/m².  Neck Circumference: 12 inches     Physical Exam:  Neck Circumference: 12 inches     Constitutional: Not in acute distress.  Eyes: Injected conjunctiva, EOMI. pupils equal reactive to light.  ENMT: Joce " "score 2. Mallampati score 2. No oral thrush.   Neck: No thyromegaly.  Trachea midline.  Heart: Regular rhythm and rate, no murmur  Lungs: Good and equal air entry bilaterally. No crackles or wheezing.  Nonlabored breathing.       Abdomen:  Soft.  No tenderness.  Positive bowel sounds.  Extremities: No cyanosis, clubbing or pitting edema.  Warm extremities and well-perfused.  Neuro: Conscious, alert, oriented x3.  Gait is normal.  Strength 5/5 in arms and legs.  Psych: Appropriate mood and affect.    Integumentary: No rash.  Normal skin turgor.  Lymphatic: No palpable cervical or supraclavicular lymph nodes.    Diagnostic data:  No results found for: \"HGBA1C\"  Total Cholesterol   Date Value Ref Range Status   10/24/2024 252 (H) 0 - 200 mg/dL Final     Triglycerides   Date Value Ref Range Status   10/24/2024 116 0 - 150 mg/dL Final     HDL Cholesterol   Date Value Ref Range Status   10/24/2024 73 (H) 40 - 60 mg/dL Final     Hemoglobin   Date Value Ref Range Status   10/24/2024 14.2 12.0 - 15.9 g/dL Final     CO2   Date Value Ref Range Status   10/24/2024 26.0 22.0 - 29.0 mmol/L Final        Assessment   Hyersomnia  Bipolar  Anxiety/depression        Plan:  Check Actigraph x 2 weeks, PSG/MSLT. .Disucssed the potential diagnosis of hypersomnia and narcolepsy.  Discussed the need to be tapered off her current medications and be off them for at least 2 weeks prior to PSG/MSLT.  She will contact her psychiatrist for guidance regarding titration of the medications.  She seems to be on a really low dose of meds so could be titrated off quickly.        Julieth Lopez MD  06/13/25  10:56 EDT    This note was dictated utilizing Dragon dictation          "

## 2025-08-15 ENCOUNTER — OFFICE VISIT (OUTPATIENT)
Dept: FAMILY MEDICINE CLINIC | Facility: CLINIC | Age: 25
End: 2025-08-15
Payer: COMMERCIAL

## 2025-08-15 VITALS
OXYGEN SATURATION: 99 % | SYSTOLIC BLOOD PRESSURE: 120 MMHG | HEIGHT: 63 IN | WEIGHT: 148 LBS | HEART RATE: 111 BPM | TEMPERATURE: 98 F | BODY MASS INDEX: 26.22 KG/M2 | DIASTOLIC BLOOD PRESSURE: 70 MMHG

## 2025-08-15 DIAGNOSIS — F41.1 GENERALIZED ANXIETY DISORDER: Primary | ICD-10-CM

## 2025-08-15 DIAGNOSIS — F41.1 GENERALIZED ANXIETY DISORDER: ICD-10-CM

## 2025-08-15 PROCEDURE — 1126F AMNT PAIN NOTED NONE PRSNT: CPT | Performed by: STUDENT IN AN ORGANIZED HEALTH CARE EDUCATION/TRAINING PROGRAM

## 2025-08-15 PROCEDURE — 99214 OFFICE O/P EST MOD 30 MIN: CPT | Performed by: STUDENT IN AN ORGANIZED HEALTH CARE EDUCATION/TRAINING PROGRAM

## 2025-08-15 RX ORDER — HYDROXYZINE HYDROCHLORIDE 25 MG/1
25 TABLET, FILM COATED ORAL EVERY 8 HOURS PRN
Qty: 60 TABLET | Refills: 1 | Status: SHIPPED | OUTPATIENT
Start: 2025-08-15

## 2025-08-15 RX ORDER — BUSPIRONE HYDROCHLORIDE 10 MG/1
10 TABLET ORAL 2 TIMES DAILY
Qty: 60 TABLET | Refills: 1 | Status: SHIPPED | OUTPATIENT
Start: 2025-08-15

## 2025-08-18 ENCOUNTER — HOSPITAL ENCOUNTER (OUTPATIENT)
Dept: SLEEP MEDICINE | Facility: HOSPITAL | Age: 25
Discharge: HOME | End: 2025-08-18
Admitting: INTERNAL MEDICINE
Payer: COMMERCIAL

## 2025-08-18 DIAGNOSIS — G47.10 HYPERSOMNIA: ICD-10-CM

## 2025-08-18 PROCEDURE — 95810 POLYSOM 6/> YRS 4/> PARAM: CPT

## 2025-08-18 RX ORDER — BUSPIRONE HYDROCHLORIDE 5 MG/1
5 TABLET ORAL 2 TIMES DAILY
Qty: 180 TABLET | Refills: 0 | OUTPATIENT
Start: 2025-08-18 | End: 2025-11-16

## 2025-08-22 ENCOUNTER — OFFICE VISIT (OUTPATIENT)
Dept: FAMILY MEDICINE CLINIC | Facility: CLINIC | Age: 25
End: 2025-08-22
Payer: COMMERCIAL

## 2025-08-22 VITALS
HEART RATE: 113 BPM | DIASTOLIC BLOOD PRESSURE: 70 MMHG | OXYGEN SATURATION: 100 % | TEMPERATURE: 97.3 F | SYSTOLIC BLOOD PRESSURE: 120 MMHG | WEIGHT: 149 LBS | BODY MASS INDEX: 26.4 KG/M2 | HEIGHT: 63 IN

## 2025-08-22 DIAGNOSIS — F41.1 GENERALIZED ANXIETY DISORDER: Primary | ICD-10-CM

## 2025-08-22 PROCEDURE — 99213 OFFICE O/P EST LOW 20 MIN: CPT | Performed by: STUDENT IN AN ORGANIZED HEALTH CARE EDUCATION/TRAINING PROGRAM

## 2025-08-22 PROCEDURE — 1159F MED LIST DOCD IN RCRD: CPT | Performed by: STUDENT IN AN ORGANIZED HEALTH CARE EDUCATION/TRAINING PROGRAM

## 2025-08-22 PROCEDURE — 1126F AMNT PAIN NOTED NONE PRSNT: CPT | Performed by: STUDENT IN AN ORGANIZED HEALTH CARE EDUCATION/TRAINING PROGRAM

## 2025-08-22 PROCEDURE — 1160F RVW MEDS BY RX/DR IN RCRD: CPT | Performed by: STUDENT IN AN ORGANIZED HEALTH CARE EDUCATION/TRAINING PROGRAM

## 2025-08-22 RX ORDER — HYDROXYZINE HYDROCHLORIDE 50 MG/1
50 TABLET, FILM COATED ORAL 3 TIMES DAILY PRN
Qty: 90 TABLET | Refills: 1 | Status: SHIPPED | OUTPATIENT
Start: 2025-08-22